# Patient Record
Sex: MALE | Race: OTHER | Employment: FULL TIME | URBAN - METROPOLITAN AREA
[De-identification: names, ages, dates, MRNs, and addresses within clinical notes are randomized per-mention and may not be internally consistent; named-entity substitution may affect disease eponyms.]

---

## 2017-03-08 PROBLEM — R35.1 NOCTURIA: Status: ACTIVE | Noted: 2017-03-08

## 2017-04-07 ENCOUNTER — HOSPITAL ENCOUNTER (OUTPATIENT)
Age: 52
Setting detail: SPECIMEN
Discharge: HOME | End: 2017-04-07
Payer: COMMERCIAL

## 2017-04-07 PROBLEM — N35.911 STRICTURE OF MALE URETHRAL MEATUS: Status: ACTIVE | Noted: 2017-04-07

## 2017-04-07 PROBLEM — C67.0 CANCER OF TRIGONE OF URINARY BLADDER (HCC): Status: ACTIVE | Noted: 2017-04-07

## 2017-04-07 PROBLEM — R10.9 FLANK PAIN: Status: ACTIVE | Noted: 2017-04-07

## 2017-04-07 PROCEDURE — 88305 TISSUE EXAM BY PATHOLOGIST: CPT

## 2017-04-11 LAB — SURGICAL PATHOLOGY REPORT: NORMAL

## 2017-07-10 ENCOUNTER — HOSPITAL ENCOUNTER (OUTPATIENT)
Facility: CLINIC | Age: 52
Discharge: HOME | End: 2017-07-10
Payer: COMMERCIAL

## 2017-07-10 ENCOUNTER — HOSPITAL ENCOUNTER (OUTPATIENT)
Dept: GENERAL RADIOLOGY | Facility: CLINIC | Age: 52
Discharge: HOME | End: 2017-07-10
Payer: COMMERCIAL

## 2017-07-10 DIAGNOSIS — M72.2 PLANTAR FASCIITIS: ICD-10-CM

## 2017-07-10 PROCEDURE — 73630 X-RAY EXAM OF FOOT: CPT

## 2017-07-19 PROBLEM — C67.0 CANCER OF TRIGONE OF URINARY BLADDER (HCC): Status: RESOLVED | Noted: 2017-04-07 | Resolved: 2017-07-19

## 2017-07-19 PROBLEM — Z86.018 HISTORY OF INVERTED PAPILLOMA: Status: ACTIVE | Noted: 2017-07-19

## 2018-12-06 PROBLEM — N50.82 SCROTAL PAIN: Status: ACTIVE | Noted: 2018-12-06

## 2019-12-23 PROBLEM — N43.41 SPERMATOCELE OF EPIDIDYMIS, SINGLE: Status: ACTIVE | Noted: 2019-12-23

## 2021-05-28 PROBLEM — N30.00 ACUTE CYSTITIS WITHOUT HEMATURIA: Status: ACTIVE | Noted: 2021-05-28

## 2021-05-28 PROBLEM — N40.0 BPH WITHOUT OBSTRUCTION/LOWER URINARY TRACT SYMPTOMS: Status: ACTIVE | Noted: 2021-05-28

## 2022-01-13 ENCOUNTER — HOSPITAL ENCOUNTER (OUTPATIENT)
Dept: GENERAL RADIOLOGY | Facility: CLINIC | Age: 57
Discharge: HOME | End: 2022-01-15
Payer: COMMERCIAL

## 2022-01-13 DIAGNOSIS — M79.605 LEG PAIN, ANTERIOR, LEFT: ICD-10-CM

## 2022-01-13 PROCEDURE — 73590 X-RAY EXAM OF LOWER LEG: CPT

## 2023-05-25 ENCOUNTER — HOSPITAL ENCOUNTER (OUTPATIENT)
Dept: ULTRASOUND IMAGING | Age: 58
Discharge: HOME | End: 2023-05-27
Attending: SPECIALIST
Payer: COMMERCIAL

## 2023-05-25 DIAGNOSIS — N43.3 LEFT HYDROCELE: ICD-10-CM

## 2023-05-25 PROCEDURE — 93976 VASCULAR STUDY: CPT

## 2024-02-27 LAB
ALBUMIN: 3.7 G/DL
ALK PHOSPHATASE: 109 U/L
ALT SERPL-CCNC: 56 U/L
AST SERPL-CCNC: 39 U/L
BILIRUB SERPL-MCNC: 0.6 MG/DL
BUN BLDV-MCNC: 13 MG/DL
CALCIUM SERPL-MCNC: 9 MG/DL
CHLORIDE BLD-SCNC: 103 MMOL/L
CHOLESTEROL/HDL RATIO: 5.2
CHOLESTEROL: 240 MG/DL
CO2: 28 MMOL/L
CPK: 215 U/L
CREAT SERPL-MCNC: 0.53 MG/DL
EST. AVERAGE GLUCOSE BLD GHB EST-MCNC: 258 MG/DL
GFR AFRICAN AMERICAN: 193.9 ML/M1.7
GFR NON-AFRICAN AMERICAN: 160.2 ML/M1.7
GLUCOSE: 200 MG/DL
HBA1C MFR BLD: 10.6 %
HDLC SERPL-MCNC: 46 MG/DL
LDL CHOLESTEROL CALCULATED: 119 MG/DL
POTASSIUM SERPL-SCNC: 4.1 MMOL/L
SODIUM BLD-SCNC: 139 MMOL/L
TOTAL PROTEIN: 7.5 G/DL
TRIGL SERPL-MCNC: 377 MG/DL
VLDLC SERPL CALC-MCNC: 75 MG/DL

## 2024-03-07 LAB
BUN BLDV-MCNC: 15 MG/DL
CALCIUM SERPL-MCNC: 9.6 MG/DL
CHLORIDE BLD-SCNC: 102 MMOL/L
CO2: 28 MMOL/L
CREAT SERPL-MCNC: 0.52 MG/DL
GFR AFRICAN AMERICAN: 198.2 ML/M1.7
GFR NON-AFRICAN AMERICAN: 163.8 ML/M1.7
GLUCOSE: 220 MG/DL
POTASSIUM SERPL-SCNC: 4.3 MMOL/L
SODIUM BLD-SCNC: 140 MMOL/L

## 2024-04-23 LAB
ALBUMIN: 4.7 G/DL
ALK PHOSPHATASE: 110 U/L
ALT SERPL-CCNC: 52 U/L
AST SERPL-CCNC: 39 U/L
BILIRUB SERPL-MCNC: 0.7 MG/DL
BUN BLDV-MCNC: 14 MG/DL
CALCIUM SERPL-MCNC: 9.5 MG/DL
CHLORIDE BLD-SCNC: 102 MMOL/L
CHOLESTEROL/HDL RATIO: 4.2
CHOLESTEROL: 215 MG/DL
CO2: 28 MMOL/L
CPK: 154 U/L
CREAT SERPL-MCNC: 0.59 MG/DL
GFR AFRICAN AMERICAN: 171.3 ML/M1.7
GFR NON-AFRICAN AMERICAN: 141.6 ML/M1.7
GLUCOSE: 129 MG/DL
HDLC SERPL-MCNC: 51 MG/DL
LDL CHOLESTEROL CALCULATED: 105 MG/DL
POTASSIUM SERPL-SCNC: 4.6 MMOL/L
SODIUM BLD-SCNC: 139 MMOL/L
TOTAL PROTEIN: 7.6 G/DL
TRIGL SERPL-MCNC: 294 MG/DL
VLDLC SERPL CALC-MCNC: 59 MG/DL

## 2025-01-27 ENCOUNTER — HOSPITAL ENCOUNTER (OUTPATIENT)
Age: 60
Discharge: HOME | End: 2025-01-27
Payer: COMMERCIAL

## 2025-01-27 ENCOUNTER — OFFICE VISIT (OUTPATIENT)
Age: 60
End: 2025-01-27
Payer: COMMERCIAL

## 2025-01-27 DIAGNOSIS — N40.0 BPH WITHOUT OBSTRUCTION/LOWER URINARY TRACT SYMPTOMS: ICD-10-CM

## 2025-01-27 DIAGNOSIS — N43.42 SPERMATOCELE OF EPIDIDYMIS, MULTIPLE: ICD-10-CM

## 2025-01-27 DIAGNOSIS — N35.811 OTHER STRICTURE OF URETHRAL MEATUS IN MALE: Primary | ICD-10-CM

## 2025-01-27 LAB
BILIRUBIN, POC: NORMAL
BLOOD URINE, POC: NORMAL
CLARITY, POC: CLEAR
COLOR, POC: YELLOW
GLUCOSE URINE, POC: >=1000 MG/DL
KETONES, POC: NORMAL
LEUKOCYTE EST, POC: NORMAL
NITRITE, POC: NORMAL
PH, POC: NORMAL
PROTEIN, POC: NORMAL MG/DL
PSA SERPL-MCNC: 0.26 NG/ML (ref 0–4)
SPECIFIC GRAVITY, POC: NORMAL
UROBILINOGEN, POC: NORMAL

## 2025-01-27 PROCEDURE — 99214 OFFICE O/P EST MOD 30 MIN: CPT | Performed by: SPECIALIST

## 2025-01-27 PROCEDURE — 36415 COLL VENOUS BLD VENIPUNCTURE: CPT

## 2025-01-27 PROCEDURE — 84153 ASSAY OF PSA TOTAL: CPT

## 2025-01-27 PROCEDURE — 81003 URINALYSIS AUTO W/O SCOPE: CPT | Performed by: SPECIALIST

## 2025-01-27 RX ORDER — EMPAGLIFLOZIN 25 MG/1
TABLET, FILM COATED ORAL
COMMUNITY
Start: 2025-01-15

## 2025-01-27 RX ORDER — CEPHALEXIN 500 MG/1
CAPSULE ORAL
COMMUNITY
Start: 2025-01-24

## 2025-01-27 RX ORDER — BISOPROLOL FUMARATE 5 MG/1
TABLET, FILM COATED ORAL
COMMUNITY
Start: 2024-12-12

## 2025-01-27 RX ORDER — HYDROCHLOROTHIAZIDE 12.5 MG/1
TABLET ORAL
COMMUNITY
Start: 2024-12-12

## 2025-01-27 RX ORDER — ROSUVASTATIN CALCIUM 5 MG/1
TABLET, COATED ORAL
COMMUNITY
Start: 2024-12-12

## 2025-01-27 RX ORDER — LOSARTAN POTASSIUM 100 MG/1
TABLET ORAL
COMMUNITY
Start: 2024-12-12

## 2025-01-27 RX ORDER — TRAMADOL HYDROCHLORIDE 50 MG/1
TABLET ORAL
COMMUNITY
Start: 2024-12-23

## 2025-01-27 NOTE — PROGRESS NOTES
total testosterone:  No results found for: \"TESTOSTERONE\"    Last BUN and creatinine:  Lab Results   Component Value Date    BUN 14 04/23/2024     Lab Results   Component Value Date    CREATININE 0.59 (L) 04/23/2024       Last CBC:  Lab Results   Component Value Date    WBC 7.3 06/24/2016    HGB 14.1 06/24/2016    HCT 43.3 06/24/2016    MCV 86.4 06/24/2016     06/24/2016       Additional Lab/Culture results: none    Imaging Reviewed during this Office Visit: Scrotal US 5/25/23  IMPRESSION:  Testicles appear unremarkable with flow demonstrated bilaterally.     Multiple large left epididymal cysts/spermatoceles, the largest measuring up  to 7.5 cm.  Differential includes a complex hydrocele with septations.     Several right epididymal cysts, largest measuring up to 1.4 cm       (results were independently reviewed by physician and radiology report verified)    Physical Exam:    There were no vitals filed for this visit.  There is no height or weight on file to calculate BMI.  Patient is a 59 y.o. male in no acute distress and alert and oriented to person, place and time.  Prostate: prostate smooth and symmetric without tenderness or nodules     Assessment and Plan   Assessment   1. Other stricture of urethral meatus in male    2. BPH without obstruction/lower urinary tract symptoms    3. Spermatocele of epididymis, multiple, bilateral            Plan    Return in about 1 year (around 1/27/2026).  His lower urinary tract symptoms (\"BPH\") are not bothersome enough to warrant medical Rx.  Patient has some increased frequency due to his DM and use of Jardiance.  Patient has L>R spermatoceles on PE and 5/25/23 scrotal U/S with doppler.  He is otherwise asymptomatic and these are not bothersome enough to warrant surgical Rx.  Today's DYLAN was normal with no suspicious nodules or induration.   Serum PSA to screen for prostate cancer.   He denies any obstructive voiding symptoms to suggest recurrent urethral meatal

## 2025-04-11 ENCOUNTER — HOSPITAL ENCOUNTER (OUTPATIENT)
Age: 60
Setting detail: SPECIMEN
Discharge: HOME | End: 2025-04-11
Payer: COMMERCIAL

## 2025-04-11 DIAGNOSIS — N30.00 ACUTE CYSTITIS WITHOUT HEMATURIA: Primary | ICD-10-CM

## 2025-04-11 DIAGNOSIS — N30.00 ACUTE CYSTITIS WITHOUT HEMATURIA: ICD-10-CM

## 2025-04-11 PROCEDURE — 87086 URINE CULTURE/COLONY COUNT: CPT

## 2025-04-11 PROCEDURE — 87186 SC STD MICRODIL/AGAR DIL: CPT

## 2025-04-11 PROCEDURE — 87077 CULTURE AEROBIC IDENTIFY: CPT

## 2025-04-11 RX ORDER — SULFAMETHOXAZOLE AND TRIMETHOPRIM 800; 160 MG/1; MG/1
1 TABLET ORAL 2 TIMES DAILY
Qty: 20 TABLET | Refills: 0 | Status: SHIPPED | OUTPATIENT
Start: 2025-04-11

## 2025-04-13 LAB
MICROORGANISM SPEC CULT: ABNORMAL
SERVICE CMNT-IMP: ABNORMAL
SPECIMEN DESCRIPTION: ABNORMAL

## 2025-04-14 ENCOUNTER — RESULTS FOLLOW-UP (OUTPATIENT)
Age: 60
End: 2025-04-14

## 2025-04-14 DIAGNOSIS — N39.0 URINARY TRACT INFECTION WITHOUT HEMATURIA, SITE UNSPECIFIED: Primary | ICD-10-CM

## 2025-04-29 ENCOUNTER — HOSPITAL ENCOUNTER (OUTPATIENT)
Age: 60
Setting detail: SPECIMEN
Discharge: HOME | End: 2025-04-29
Payer: COMMERCIAL

## 2025-04-29 DIAGNOSIS — N39.0 URINARY TRACT INFECTION WITHOUT HEMATURIA, SITE UNSPECIFIED: ICD-10-CM

## 2025-04-29 PROCEDURE — 86403 PARTICLE AGGLUT ANTBDY SCRN: CPT

## 2025-04-29 PROCEDURE — 87086 URINE CULTURE/COLONY COUNT: CPT

## 2025-04-30 LAB
MICROORGANISM SPEC CULT: ABNORMAL
SERVICE CMNT-IMP: ABNORMAL
SPECIMEN DESCRIPTION: ABNORMAL

## 2025-05-01 ENCOUNTER — RESULTS FOLLOW-UP (OUTPATIENT)
Age: 60
End: 2025-05-01

## 2025-07-13 RX ORDER — SULFAMETHOXAZOLE AND TRIMETHOPRIM 800; 160 MG/1; MG/1
1 TABLET ORAL 2 TIMES DAILY
Qty: 14 TABLET | Refills: 0 | Status: SHIPPED | OUTPATIENT
Start: 2025-07-13 | End: 2025-07-20